# Patient Record
Sex: FEMALE | Race: WHITE | NOT HISPANIC OR LATINO | Employment: OTHER | URBAN - METROPOLITAN AREA
[De-identification: names, ages, dates, MRNs, and addresses within clinical notes are randomized per-mention and may not be internally consistent; named-entity substitution may affect disease eponyms.]

---

## 2019-01-31 LAB — HCV AB SER-ACNC: NORMAL

## 2021-07-12 ENCOUNTER — VBI (OUTPATIENT)
Dept: ADMINISTRATIVE | Facility: OTHER | Age: 66
End: 2021-07-12

## 2021-07-12 NOTE — TELEPHONE ENCOUNTER
Upon review of the In Basket request we were able to locate, review, and update the patient chart as requested for Hepatitis C , Immunization(s) Influenza, Zoster, Mammogram and Pap Smear (HPV) aka Cervical Cancer Screening  Any additional questions or concerns should be emailed to the Practice Liaisons via [email or web address] email, please do not reply via In Basket      Thank you  Thana Husbands

## 2021-08-04 ENCOUNTER — ANNUAL EXAM (OUTPATIENT)
Dept: OBGYN CLINIC | Facility: CLINIC | Age: 66
End: 2021-08-04
Payer: COMMERCIAL

## 2021-08-04 VITALS
WEIGHT: 132.4 LBS | BODY MASS INDEX: 23.46 KG/M2 | DIASTOLIC BLOOD PRESSURE: 70 MMHG | SYSTOLIC BLOOD PRESSURE: 128 MMHG | HEIGHT: 63 IN

## 2021-08-04 DIAGNOSIS — Z01.411 ENCNTR FOR GYN EXAM (GENERAL) (ROUTINE) W ABNORMAL FINDINGS: Primary | ICD-10-CM

## 2021-08-04 DIAGNOSIS — M89.9 BONE DISORDER: ICD-10-CM

## 2021-08-04 DIAGNOSIS — E28.39 ESTROGEN DEFICIENCY: ICD-10-CM

## 2021-08-04 DIAGNOSIS — Z80.3 FAMILY HISTORY OF BREAST CANCER: ICD-10-CM

## 2021-08-04 DIAGNOSIS — Z12.31 ENCOUNTER FOR SCREENING MAMMOGRAM FOR MALIGNANT NEOPLASM OF BREAST: ICD-10-CM

## 2021-08-04 DIAGNOSIS — L29.2 ITCHING OF VULVA: ICD-10-CM

## 2021-08-04 DIAGNOSIS — N89.8 VAGINAL DISCHARGE: ICD-10-CM

## 2021-08-04 PROCEDURE — G0101 CA SCREEN;PELVIC/BREAST EXAM: HCPCS | Performed by: OBSTETRICS & GYNECOLOGY

## 2021-08-04 PROCEDURE — 99213 OFFICE O/P EST LOW 20 MIN: CPT | Performed by: OBSTETRICS & GYNECOLOGY

## 2021-08-04 RX ORDER — DULOXETIN HYDROCHLORIDE 60 MG/1
60 CAPSULE, DELAYED RELEASE ORAL DAILY
COMMUNITY
Start: 2021-07-19

## 2021-08-04 RX ORDER — MULTIVIT-MIN/IRON/FOLIC ACID/K 18-600-40
1 CAPSULE ORAL DAILY
COMMUNITY

## 2021-08-04 RX ORDER — LEVOTHYROXINE SODIUM 75 MCG
75 TABLET ORAL DAILY
COMMUNITY
Start: 2021-07-10

## 2021-08-04 RX ORDER — LIOTHYRONINE SODIUM 5 UG/1
5 TABLET ORAL DAILY
COMMUNITY

## 2021-08-05 NOTE — PROGRESS NOTES
Medicare [facility]  [address] [facility] Shikha Marquis 1    ASSESSMENT/PLAN: Jackelin Rachel is a 72 y o   who presents for [facility] gynecologic wellness exam     Encounter for routine gynecologic examination  - Routine well woman exam completed today  - Cervical Cancer Screening complete, no further screening necessary   - Breast Cancer Screening: Last Mammogram 2021  - Colorectal cancer screening uptodate   - Osteoporosis screening: ordered Dexa scan today  - The following were reviewed in today's visit: breast self exam     Discussed with patient Medicare (and plans that act like Medicare) pay for wellness visit q 2 yrs - but patient may return for problems as needed  Recommend annual breast exam and mammogram   If not seen by our office on her off year, she can still call and ask for a screening mammogram order and the nurses will provide one for her  Additional problems addressed during this visit:  1  Encntr for gyn exam (general) (routine) w abnormal findings    2  Encounter for screening mammogram for malignant neoplasm of breast  -     Mammo screening bilateral w 3d & cad; Future    3  Family history of breast cancer  -     Mammo screening bilateral w 3d & cad; Future    4  Estrogen deficiency  -     DXA bone density spine hip and pelvis; Future; Expected date: 2021    5  Bone disorder  -     DXA bone density spine hip and pelvis; Future; Expected date: 2021    6  Itching of vulva      -  Informed pt that Sylvia-Danlos Syndrome is not the cause of her itching at vulva and Sylvia-Danlos Syndrome would present systemic symptoms, not localized  In addition, pt has not been diagnosed with Sylvia-Danlos Syndrome         -  Advised pt to sleep without underwear at bed time and trim the hair on Vulva, consider applying A&D ointment to prevent itching due to excessive sweat    7  Vaginal discharge      -  Informed patient of exam today being negative for vaginal yeast or B V  No abnormal vaginal discharge noted    Next visit: 2 yrs/PRN      CC:  Medicare Gynecologic Wellness Examination    HPI: Amelia Gutierrez is a 72 y o   who presents for Krystyna Cutler gynecologic examination  Pt presents for yearly Gyn exam   Pt states she has had itching a vulva for several years that increases during the summer and pt believes it is due to her seating more in the summer  Pt states biopsy of the site by previous GYN was negative for Lichen sclerosis  Pt Rx for antifungal cream to treat possible chronic or reoccurring yeast infection  Pt also concerned she might have Bacterial vaginosis due to having itching and foul odor on fingers after touching the labia  Pt states that her son was recently diagnosed with Sylvia-Danlos Syndrome which effects his joints and skin and patient is wondering that her itching and increased sweating at labia could be due to her having less severe form of Sylvia-Danlos  Pt denies having any discomfort at perineum, labia or vagina  Denies having dyspareunia  Gyn History       She  reports previously being sexually active and has had partner(s) who are Male  She reports using the following method of birth control/protection: Post-menopausal        Past Medical History:  No date:  3 para 3  2021: History of screening mammography      Comment:  Bi-Rads 2  No date: Hypothyroid  No date: Osteopenia  10/30/2019: Papanicolaou smear     Past Surgical History:  2005: BUNIONECTOMY  1987:  SECTION  2014: ELBOW SURGERY; Left  2021: MAMMO (HISTORICAL);  Bilateral      Comment:  [facility]  2017: OSTEOTOMY OF FINGER; Right      Comment:  thumb for osteoarthritis     Family History   Problem Relation Age of Onset   [facility] Breast cancer Mother     Lung cancer Mother     Colon cancer Father     Diabetes Maternal Uncle     Uterine cancer Neg Hx     Ovarian cancer Neg Hx         Social History     Tobacco Use    Smoking status: Never Smoker    Smokeless tobacco: Never Used   Vaping Use    Vaping Use: Never used   Substance Use Topics    Alcohol use: Yes     Comment: 4 x week    Drug use: Never     Comment: No use          Current Outpatient Medications:     Cholecalciferol (Vitamin D) 50 MCG (2000 UT) CAPS, Take 1 capsule by mouth daily, Disp: , Rfl:     DULoxetine (CYMBALTA) 60 mg delayed release capsule, Take 60 mg by mouth daily, Disp: , Rfl:     liothyronine (Cytomel) 5 mcg tablet, Take 5 mcg by mouth daily 1 1/2 tablet Mon  Through Sat  And 2 tablets on Sun , Disp: , Rfl:     Melatonin 5 MG/15ML LIQD, 1/2  tablet at bedtime as needed with food, Disp: , Rfl:     Multiple Vitamins-Minerals (MULTI FOR HER 50+ PO), as directed, Disp: , Rfl:     Synthroid 75 MCG tablet, Take 75 mcg by mouth daily 1 tablet Mon   through Sat and 1/2 tablet on Sunday  , Disp: , Rfl:     She is allergic to erythromycin       ROS negative except as noted in HPI    Objective:  /70   Ht 5' 2 5" (1 588 m)   Wt 60 1 kg (132 lb 6 4 oz)   Breastfeeding No   BMI 23 83 kg/m²      Physical Exam  Vitals and nursing note reviewed  HENT:      Head: Normocephalic  Chest:      Breasts: Breasts are symmetrical          Right: Normal  No bleeding, mass, nipple discharge, skin change or tenderness  Left: Normal  No bleeding, mass, nipple discharge, skin change or tenderness  Abdominal:      General: There is no distension  Palpations: Abdomen is soft  There is no mass  Tenderness: There is no abdominal tenderness  There is no rebound  Genitourinary:     General: Normal vulva  Exam position: Lithotomy position  Labia:         Right: No rash, tenderness or lesion  Left: No rash, tenderness or lesion  Urethra: No urethral pain or urethral lesion  Vagina: Normal  No vaginal discharge  Cervix: No discharge, friability, lesion or erythema        Uterus: Normal        Adnexa: Right adnexa normal and left adnexa normal       Rectum: No external hemorrhoid  Musculoskeletal:      Right lower leg: No edema  Left lower leg: No edema  Lymphadenopathy:      Upper Body:      Right upper body: No axillary or pectoral adenopathy  Left upper body: No axillary or pectoral adenopathy  Skin:     General: Skin is warm  Neurological:      Mental Status: She is alert and oriented to person, place, and time  Psychiatric:         Mood and Affect: Mood normal          Behavior: Behavior normal          Thought Content:  Thought content normal

## 2022-06-01 ENCOUNTER — OFFICE VISIT (OUTPATIENT)
Dept: OBGYN CLINIC | Facility: CLINIC | Age: 67
End: 2022-06-01
Payer: COMMERCIAL

## 2022-06-01 VITALS — BODY MASS INDEX: 24.66 KG/M2 | SYSTOLIC BLOOD PRESSURE: 114 MMHG | DIASTOLIC BLOOD PRESSURE: 70 MMHG | WEIGHT: 137 LBS

## 2022-06-01 DIAGNOSIS — M85.89 OSTEOPENIA OF MULTIPLE SITES: Primary | ICD-10-CM

## 2022-06-01 PROCEDURE — 99213 OFFICE O/P EST LOW 20 MIN: CPT | Performed by: OBSTETRICS & GYNECOLOGY

## 2022-06-01 NOTE — PROGRESS NOTES
Assessment/Plan:      Diagnoses and all orders for this visit:    Osteopenia of multiple sites     -  Informed patient of Dexa scan findings being c/w osteopenia  Calcium  Vit D and exercise reviewed  Treatment with Fosamax reviewed  Patient declined, desires to start calcium, Vit D and and exercise and monitor bone density  If further decline noted with next Dexa scan, will consider starting medical treatment        Subjective:     Patient ID: Emily Larose is a 77 y o  female  Patient presents to review Dexa scan results  Review of Systems   Constitutional: Negative for activity change and unexpected weight change  Musculoskeletal: Negative for arthralgias, back pain, gait problem, joint swelling, neck pain and neck stiffness           Objective:     Physical Exam

## 2023-02-13 ENCOUNTER — PREP FOR PROCEDURE (OUTPATIENT)
Dept: GASTROENTEROLOGY | Facility: CLINIC | Age: 68
End: 2023-02-13

## 2023-02-13 ENCOUNTER — TELEPHONE (OUTPATIENT)
Dept: GASTROENTEROLOGY | Facility: CLINIC | Age: 68
End: 2023-02-13

## 2023-02-13 DIAGNOSIS — Z12.11 SCREENING FOR COLON CANCER: Primary | ICD-10-CM

## 2023-02-13 DIAGNOSIS — Z80.0 FAMILY HISTORY OF COLON CANCER: Primary | ICD-10-CM

## 2023-02-13 NOTE — TELEPHONE ENCOUNTER
02/13/23  Screened by: Danyelle Moreno    Referring Provider     Pre- Screening: There is no height or weight on file to calculate BMI  Has patient been referred for a routine screening Colonoscopy? yes  Is the patient between 39-70 years old? yes      Previous Colonoscopy yes   If yes:    Date: 5+ years    Facility:     Reason:       SCHEDULING STAFF: If the patient is between 39yrs-47yrs, please advise patient to confirm benefits/coverage with their insurance company for a routine screening colonoscopy, some insurance carriers will only cover at [address] or older  If the patient is over 66years old, please schedule an office visit  Does the patient want to see a Gastroenterologist prior to their procedure OR are they having any GI symptoms? no    Has the patient been hospitalized or had abdominal surgery in the past 6 months? no    Does the patient use supplemental oxygen? no    Does the patient take Coumadin, Lovenox, Plavix, Elliquis, Xarelto, or other blood thinning medication? no    Has the patient had a stroke, cardiac event, or stent placed in the past year? no    SCHEDULING STAFF: If patient answers NO to above questions, then schedule procedure  If patient answers YES to above questions, then schedule office appointment  Pt passed OA      If patient is between 45yrs - 49yrs, please advise patient that we will have to confirm benefits & coverage with their insurance company for a routine screening colonoscopy

## 2023-02-13 NOTE — TELEPHONE ENCOUNTER
[facility] Assessment    Name: Princess Reddy  YOB: 1955  Last Height: 5' 2 5" (1 588 m)  Last weight: 62 1 kg (137 lb)  BMI: 25 10  Procedure: Colonoscopy  Diagnosis: family history of colon cancer  Date of procedure: 4/6/23  Prep:   Responsible : ? Phone#: ?  Name completing form: Kristopher Aguilar  Date form completed: 02/13/23      If the patient answers yes to any of these questions, schedule in a hospital  Are you pregnant: No  Do you rely on a wheelchair for mobility: No  Have you been diagnosed with End Stage Renal Disease (ESRD): No  Do you need oxygen during the day: No  Have you had a heart attack or stroke within the past three months: No  Have you had a seizure within the past three months: No  Have you ever been informed by anesthesia that you have a difficult airway: No  Additional Questions  Have you had any cardiac testing or are under the care of a Cardiologist (see cardiac list): No  Cardiac list:   Do you have an implanted cardiac defibrillator: No (Comment:  This patient should be scheduled in the hospital)    Have any bleeding problems, such as anemia or hemophilia (If patient has H&H result below 8, schedule in hospital   H&H must be within 30 days of procedure): No    Had an organ transplant within the past 3 months: No    Do you have any present infections: No  Do you get short of breath when walking a few blocks: No  Have you been diagnosed with diabetes: No  Comments (provide cardiac provider information if applicable):

## 2023-02-13 NOTE — TELEPHONE ENCOUNTER
Scheduled date of colonoscopy (as of today):4/6/23    Physician performing colonoscopy:Dr Luna    Location of colonoscopy:[facility]      Clearances: N/A

## 2023-02-14 NOTE — TELEPHONE ENCOUNTER
Pt would like to pick one of the preps that was offered to her     she would like to pick the one with the Gatorade and Miralax     would like for someone to please give her a call back tomorrow to also explain the prep to her  Please call her at her cell phone number provided

## 2023-02-15 RX ORDER — SODIUM PICOSULFATE, MAGNESIUM OXIDE, AND ANHYDROUS CITRIC ACID 10; 3.5; 12 MG/160ML; G/160ML; G/160ML
LIQUID ORAL
Qty: 320 ML | Refills: 0 | Status: SHIPPED | OUTPATIENT
Start: 2023-02-15

## 2023-02-15 NOTE — TELEPHONE ENCOUNTER
Spoke with pt  Had a long discussion about prep options  She decided that she wanted to do Clenpiq, she is OK to pay for it if it is not covered  Will e-mail her new instructions

## 2023-03-15 ENCOUNTER — TELEPHONE (OUTPATIENT)
Dept: OBGYN CLINIC | Facility: CLINIC | Age: 68
End: 2023-03-15

## 2023-03-15 DIAGNOSIS — Z12.31 ENCOUNTER FOR SCREENING MAMMOGRAM FOR MALIGNANT NEOPLASM OF BREAST: Primary | ICD-10-CM

## 2023-03-15 NOTE — TELEPHONE ENCOUNTER
Spoke with Joselito Hannon, advised mammo order generated  She would like to  in [place] office  Message to Katie Cooper who will be  in [employer]

## 2023-03-15 NOTE — TELEPHONE ENCOUNTER
Requesting to have Mammogram Order sent to [address] [place] [address] Scheduling   Has Medicare and is not due for wellness until 8/2023 which has been scheduled

## 2023-04-06 ENCOUNTER — ANESTHESIA EVENT (OUTPATIENT)
Dept: GASTROENTEROLOGY | Facility: AMBULATORY SURGERY CENTER | Age: 68
End: 2023-04-06

## 2023-04-06 ENCOUNTER — ANESTHESIA (OUTPATIENT)
Dept: GASTROENTEROLOGY | Facility: AMBULATORY SURGERY CENTER | Age: 68
End: 2023-04-06

## 2023-04-06 PROBLEM — E03.9 HYPOTHYROIDISM: Status: ACTIVE | Noted: 2023-04-06

## 2023-04-06 PROBLEM — F41.9 ANXIETY: Status: ACTIVE | Noted: 2023-04-06

## 2023-04-06 RX ORDER — PROPOFOL 10 MG/ML
INJECTION, EMULSION INTRAVENOUS AS NEEDED
Status: DISCONTINUED | OUTPATIENT
Start: 2023-04-06 | End: 2023-04-06

## 2023-04-06 RX ADMIN — PROPOFOL 50 MG: 10 INJECTION, EMULSION INTRAVENOUS at 08:41

## 2023-04-06 RX ADMIN — SODIUM CHLORIDE, SODIUM LACTATE, POTASSIUM CHLORIDE, CALCIUM CHLORIDE: 600; 310; 30; 20 INJECTION, SOLUTION INTRAVENOUS at 08:51

## 2023-04-06 RX ADMIN — PROPOFOL 50 MG: 10 INJECTION, EMULSION INTRAVENOUS at 08:46

## 2023-04-06 RX ADMIN — PROPOFOL 100 MG: 10 INJECTION, EMULSION INTRAVENOUS at 08:38

## 2023-04-06 RX ADMIN — PROPOFOL 50 MG: 10 INJECTION, EMULSION INTRAVENOUS at 08:51

## 2023-04-06 NOTE — ANESTHESIA POSTPROCEDURE EVALUATION
Post-Op Assessment Note    CV Status:  Stable  Pain Score: 0    Pain management: adequate     Mental Status:  Sleepy   Hydration Status:  Euvolemic and stable   PONV Controlled:  None   Airway Patency:  Patent      Post Op Vitals Reviewed: Yes      Staff: CRNA         No notable events documented      /67 (04/06/23 0858)    Temp      Pulse 62 (04/06/23 0858)   Resp 18 (04/06/23 0858)    SpO2 100 % (04/06/23 0858)

## 2023-04-06 NOTE — ANESTHESIA PREPROCEDURE EVALUATION
Procedure:  COLONOSCOPY    Relevant Problems   ANESTHESIA (within normal limits)   (-) History of anesthesia complications      CARDIO   (-) Chest pain   (-) HER (dyspnea on exertion)      ENDO   (+) Hypothyroidism      NEURO/PSYCH   (+) Anxiety      PULMONARY   (-) Shortness of breath   (-) URI (upper respiratory infection)        Physical Exam    Airway    Mallampati score: II  TM Distance: >3 FB  Neck ROM: full     Dental       Cardiovascular      Pulmonary      Other Findings        Anesthesia Plan  ASA Score- 2     Anesthesia Type- IV sedation with anesthesia with ASA Monitors  Additional Monitors:   Airway Plan:           Plan Factors-Exercise tolerance (METS): >4 METS  Chart reviewed  Patient summary reviewed  Induction- intravenous  Postoperative Plan-     Informed Consent- Anesthetic plan and risks discussed with patient  I personally reviewed this patient with the CRNA  Discussed and agreed on the Anesthesia Plan with the CRNA  Dorothy Wheeler

## 2023-04-11 DIAGNOSIS — Z12.31 ENCOUNTER FOR SCREENING MAMMOGRAM FOR MALIGNANT NEOPLASM OF BREAST: ICD-10-CM

## 2023-08-26 PROBLEM — Z01.419 ENCOUNTER FOR GYNECOLOGICAL EXAMINATION (GENERAL) (ROUTINE) WITHOUT ABNORMAL FINDINGS: Status: ACTIVE | Noted: 2023-08-26

## 2023-08-26 NOTE — PROGRESS NOTES
Assessment/Plan:    Encounter for gynecological examination (general) (routine) without abnormal findings  All well, no complaints. Had hormone testing with on-line gyn and wants to discuss their recommendations for bio-identical hormone therapy. Reports one hot flash maybe, per day. Normal breast and pelvic exams. Last pap 2019 neg/HPV neg; repeated today  Mammo order given, last 3/22/23  Colonoscopy 23, due  due to family history  Dexa 3/17/22 dexa spine -1.8, 6% decline in hip T-1.2, fem neck T-2.0; declined meds    Menopausal symptoms  One hot flash per day. Had testing with on-line gyn for estradiol and progesterone levels. Was recommended to start bioidentical hormone replacement. Discussed increased cardiovascular side effects with initiating HRT late in menopause and would NOT recommend. Discussed lack of evidence as to benefit of bioidentical replacement which are not FDA regulated. Osteopenia of lumbar spine  Had declined meds after last dexa. Dexa orders given for 2024. Calcium recs reviewed. Diagnoses and all orders for this visit:    Encounter for gynecological examination (general) (routine) without abnormal findings    Encounter for screening mammogram for malignant neoplasm of breast  -     Mammo screening bilateral w 3d & cad; Future    Osteopenia of lumbar spine  -     DXA bone density spine hip and pelvis; Future    Screening for malignant neoplasm of the cervix  -     Cancel: IGP, rfx Aptima HPV ASCU  -     IGP, rfx Aptima HPV ASCU    Other orders  -     MELATONIN GUMMIES PO; Take 1 gum by mouth if needed          Subjective:      Patient ID: Alyx Bingham is a 79 y.o. female. HPI Here for Medicare biannual breast and pelvic exams.        The following portions of the patient's history were reviewed and updated as appropriate:   She  has a past medical history of Anxiety, Arthritis, Disease of thyroid gland,  3 para 3, History of screening mammography (2021), Hypothyroid, Osteopenia, and Papanicolaou smear (10/30/2019). She   Patient Active Problem List    Diagnosis Date Noted   • Menopausal symptoms 2023   • Osteopenia of lumbar spine 2023   • Encounter for gynecological examination (general) (routine) without abnormal findings 2023   • Anxiety 2023   • Hypothyroidism 2023     She  has a past surgical history that includes  section (); Bunionectomy (); Elbow surgery (Left, ); OSTEOTOMY OF FINGER (Right, ); Mammo (historical) (Bilateral, 2021); Colonoscopy (2023); and DXA procedure(historical) (2022). Her family history includes Breast cancer in her mother; Colon cancer in her father; Diabetes in her maternal uncle; Lung cancer in her mother. She  reports that she has never smoked. She has never used smokeless tobacco. She reports current alcohol use. She reports that she does not use drugs. Current Outpatient Medications   Medication Sig Dispense Refill   • Cholecalciferol (Vitamin D) 50 MCG ( UT) CAPS Take 1 capsule by mouth daily     • DULoxetine (CYMBALTA) 60 mg delayed release capsule Take 60 mg by mouth daily     • liothyronine (CYTOMEL) 5 mcg tablet Take 10 mcg by mouth daily     • MELATONIN GUMMIES PO Take 1 gum by mouth if needed     • Multiple Vitamins-Minerals (MULTI FOR HER 50+ PO) as directed     • Synthroid 75 MCG tablet Take 75 mcg by mouth daily 1 tablet Mon.  through Sat and.1/2 tablet on . No current facility-administered medications for this visit. She is allergic to erythromycin. .    Review of Systems  No PMB, breast, bladder, bowel changes.  No new persistent pain, bloating, early satiety or pelvic pressure      Objective:      /68 (BP Location: Left arm, Patient Position: Sitting, Cuff Size: Standard)   Ht 5' 2.25" (1.581 m)   Wt 63.7 kg (140 lb 6.4 oz)   BMI 25.47 kg/m²          Physical Exam    General appearance: no distress, pleasant, anxious  Neck: thyroid without nodules or thyromegaly, no palpable adenopathy  Lymph nodes: no palpable adenopathy  Breasts: no masses, nodes or skin changes  Abdomen: soft, non tender, no palpable masses  Pelvic exam: normal atrophic external genitalia, urethral meatus normal, vagina atrophic without lesions, cervix atrophic without lesions, uterus small, non tender, no adnexal masses, non tender  Rectal exam: normal sphincter tone, no masses, RV confirms above

## 2023-08-29 ENCOUNTER — ANNUAL EXAM (OUTPATIENT)
Dept: OBGYN CLINIC | Facility: CLINIC | Age: 68
End: 2023-08-29
Payer: COMMERCIAL

## 2023-08-29 VITALS
SYSTOLIC BLOOD PRESSURE: 112 MMHG | HEIGHT: 62 IN | DIASTOLIC BLOOD PRESSURE: 68 MMHG | WEIGHT: 140.4 LBS | BODY MASS INDEX: 25.83 KG/M2

## 2023-08-29 DIAGNOSIS — Z12.31 ENCOUNTER FOR SCREENING MAMMOGRAM FOR MALIGNANT NEOPLASM OF BREAST: ICD-10-CM

## 2023-08-29 DIAGNOSIS — M85.88 OSTEOPENIA OF LUMBAR SPINE: ICD-10-CM

## 2023-08-29 DIAGNOSIS — Z01.419 ENCOUNTER FOR GYNECOLOGICAL EXAMINATION (GENERAL) (ROUTINE) WITHOUT ABNORMAL FINDINGS: Primary | ICD-10-CM

## 2023-08-29 DIAGNOSIS — Z12.4 SCREENING FOR MALIGNANT NEOPLASM OF THE CERVIX: ICD-10-CM

## 2023-08-29 PROBLEM — N95.1 MENOPAUSAL SYMPTOMS: Status: ACTIVE | Noted: 2023-08-29

## 2023-08-29 PROCEDURE — G0101 CA SCREEN;PELVIC/BREAST EXAM: HCPCS | Performed by: OBSTETRICS & GYNECOLOGY

## 2023-08-29 NOTE — PATIENT INSTRUCTIONS
Return to office in one year unless having any problems such as breast changes, bleeding, new persistent pain, new progressive bloating, new problems eating (getting full to quickly) or new constant urinary pressure that does not resolve in one week. Call in six months to schedule your annual visit. Continue to strive for 1200 mg of calcium and 1000 IU of vitamin D daily in diet and supplements combined for your bone health. You can only absorb 600 mg of calcium at a time. Avoid excess calcium as this may adversely effect your heart. There are 400 mg of calcium in an 8 oz serving of dairy.

## 2023-08-29 NOTE — LETTER
August 29, 2023     Qasim Wellington, [address] [facility] [address]    Patient: Maurice Love   YOB: 1955   Date of Visit: 8/29/2023       Dear Dr. Esau Liz: Thank you for referring Jaya Ortega to me for evaluation. Below are my notes for this consultation. If you have questions, please do not hesitate to call me. I look forward to following your patient along with you. Sincerely,        Ana Fraga MD        CC: No Recipients    Ana Fraga MD  8/29/2023  2:42 PM  Sign when Signing Visit  Assessment/Plan:    Encounter for gynecological examination (general) (routine) without abnormal findings  All well, no complaints. Had hormone testing with on-line gyn and wants to discuss their recommendations for bio-identical hormone therapy. Reports one hot flash maybe, per day. Normal breast and pelvic exams. Last pap 11/2019 neg/HPV neg; repeated today  Mammo order given, last 3/22/23  Colonoscopy 4/6/23, due 2028 due to family history  Dexa 3/17/22 dexa spine -1.8, 6% decline in hip T-1.2, fem neck T-2.0; declined meds    Menopausal symptoms  One hot flash per day. Had testing with on-line gyn for estradiol and progesterone levels. Was recommended to start bioidentical hormone replacement. Discussed increased cardiovascular side effects with initiating HRT late in menopause and would NOT recommend. Discussed lack of evidence as to benefit of bioidentical replacement which are not FDA regulated. Osteopenia of lumbar spine  Had declined meds after last dexa. Dexa orders given for 4/2024. Calcium recs reviewed. Diagnoses and all orders for this visit:    Encounter for gynecological examination (general) (routine) without abnormal findings    Encounter for screening mammogram for malignant neoplasm of breast  -     Mammo screening bilateral w 3d & cad; Future    Osteopenia of lumbar spine  -     DXA bone density spine hip and pelvis;  Future    Screening for malignant neoplasm of the cervix  -     Cancel: IGP, rfx Aptima HPV ASCU  -     IGP, rfx Aptima HPV ASCU    Other orders  -     MELATONIN GUMMIES PO; Take 1 gum by mouth if needed         Subjective:     Patient ID: Dorsey Schilder is a 79 y.o. female. HPI Here for Medicare biannual breast and pelvic exams. The following portions of the patient's history were reviewed and updated as appropriate:   She  has a past medical history of Anxiety, Arthritis, Disease of thyroid gland,  3 para 3, History of screening mammography (2021), Hypothyroid, Osteopenia, and Papanicolaou smear (10/30/2019). She   Patient Active Problem List    Diagnosis Date Noted   • Menopausal symptoms 2023   • Osteopenia of lumbar spine 2023   • Encounter for gynecological examination (general) (routine) without abnormal findings 2023   • Anxiety 2023   • Hypothyroidism 2023     She  has a past surgical history that includes  section (); Bunionectomy (); Elbow surgery (Left, ); OSTEOTOMY OF FINGER (Right, ); Mammo (historical) (Bilateral, 2021); Colonoscopy (2023); and DXA procedure(historical) (2022). Her family history includes Breast cancer in her mother; Colon cancer in her father; Diabetes in her maternal uncle; Lung cancer in her mother. She  reports that she has never smoked. She has never used smokeless tobacco. She reports current alcohol use. She reports that she does not use drugs.   Current Outpatient Medications   Medication Sig Dispense Refill   • Cholecalciferol (Vitamin D) 50 MCG ( UT) CAPS Take 1 capsule by mouth daily     • DULoxetine (CYMBALTA) 60 mg delayed release capsule Take 60 mg by mouth daily     • liothyronine (CYTOMEL) 5 mcg tablet Take 10 mcg by mouth daily     • MELATONIN GUMMIES PO Take 1 gum by mouth if needed     • Multiple Vitamins-Minerals (MULTI FOR HER 50+ PO) as directed     • Synthroid 75 MCG tablet Take 75 mcg by mouth daily 1 tablet Mon.  through Sat and.1/2 tablet on Sunday. No current facility-administered medications for this visit. She is allergic to erythromycin. .    Review of Systems No PMB, breast, bladder, bowel changes.  No new persistent pain, bloating, early satiety or pelvic pressure      Objective:      /68 (BP Location: Left arm, Patient Position: Sitting, Cuff Size: Standard)   Ht 5' 2.25" (1.581 m)   Wt 63.7 kg (140 lb 6.4 oz)   BMI 25.47 kg/m²         Physical Exam   General appearance: no distress, pleasant, anxious  Neck: thyroid without nodules or thyromegaly, no palpable adenopathy  Lymph nodes: no palpable adenopathy  Breasts: no masses, nodes or skin changes  Abdomen: soft, non tender, no palpable masses  Pelvic exam: normal atrophic external genitalia, urethral meatus normal, vagina atrophic without lesions, cervix atrophic without lesions, uterus small, non tender, no adnexal masses, non tender  Rectal exam: normal sphincter tone, no masses, RV confirms above

## 2023-08-29 NOTE — ASSESSMENT & PLAN NOTE
One hot flash per day. Had testing with on-line gyn for estradiol and progesterone levels. Was recommended to start bioidentical hormone replacement. Discussed increased cardiovascular side effects with initiating HRT late in menopause and would NOT recommend. Discussed lack of evidence as to benefit of bioidentical replacement which are not FDA regulated.

## 2023-08-29 NOTE — ASSESSMENT & PLAN NOTE
All well, no complaints. Had hormone testing with on-line gyn and wants to discuss their recommendations for bio-identical hormone therapy. Reports one hot flash maybe, per day. Normal breast and pelvic exams.   Last pap 11/2019 neg/HPV neg; repeated today  Mammo order given, last 3/22/23  Colonoscopy 4/6/23, due 2028 due to family history  Dexa 3/17/22 dexa spine -1.8, 6% decline in hip T-1.2, fem neck T-2.0; declined meds

## 2023-08-31 LAB
CYTOLOGIST CVX/VAG CYTO: NORMAL
DX ICD CODE: NORMAL
OTHER STN SPEC: NORMAL
OTHER STN SPEC: NORMAL
PATH REPORT.FINAL DX SPEC: NORMAL
SL AMB NOTE:: NORMAL
SL AMB SPECIMEN ADEQUACY: NORMAL
SL AMB TEST METHODOLOGY: NORMAL

## 2023-10-25 PROBLEM — Z01.419 ENCOUNTER FOR GYNECOLOGICAL EXAMINATION (GENERAL) (ROUTINE) WITHOUT ABNORMAL FINDINGS: Status: RESOLVED | Noted: 2023-08-26 | Resolved: 2023-10-25

## 2024-11-09 DIAGNOSIS — Z00.6 ENCOUNTER FOR EXAMINATION FOR NORMAL COMPARISON OR CONTROL IN CLINICAL RESEARCH PROGRAM: ICD-10-CM

## 2024-11-18 ENCOUNTER — APPOINTMENT (OUTPATIENT)
Dept: LAB | Facility: HOSPITAL | Age: 69
End: 2024-11-18

## 2024-11-18 DIAGNOSIS — Z00.6 ENCOUNTER FOR EXAMINATION FOR NORMAL COMPARISON OR CONTROL IN CLINICAL RESEARCH PROGRAM: ICD-10-CM

## 2024-11-18 PROCEDURE — 36415 COLL VENOUS BLD VENIPUNCTURE: CPT

## 2024-11-29 LAB
APOB+LDLR+PCSK9 GENE MUT ANL BLD/T: NOT DETECTED
BRCA1+BRCA2 DEL+DUP + FULL MUT ANL BLD/T: NOT DETECTED
MLH1+MSH2+MSH6+PMS2 GN DEL+DUP+FUL M: NOT DETECTED